# Patient Record
Sex: FEMALE | Race: WHITE | NOT HISPANIC OR LATINO | Employment: FULL TIME | URBAN - NONMETROPOLITAN AREA
[De-identification: names, ages, dates, MRNs, and addresses within clinical notes are randomized per-mention and may not be internally consistent; named-entity substitution may affect disease eponyms.]

---

## 2024-09-25 ENCOUNTER — APPOINTMENT (OUTPATIENT)
Dept: LAB | Facility: HOSPITAL | Age: 59
End: 2024-09-25
Payer: COMMERCIAL

## 2024-09-25 DIAGNOSIS — Z01.818 PRE-OP TESTING: ICD-10-CM

## 2024-09-25 DIAGNOSIS — N95.0 POSTMENOPAUSAL BLEEDING: ICD-10-CM

## 2024-09-25 LAB
BASOPHILS # BLD AUTO: 0.04 THOUSANDS/ΜL (ref 0–0.1)
BASOPHILS NFR BLD AUTO: 1 % (ref 0–1)
EOSINOPHIL # BLD AUTO: 0.11 THOUSAND/ΜL (ref 0–0.61)
EOSINOPHIL NFR BLD AUTO: 2 % (ref 0–6)
ERYTHROCYTE [DISTWIDTH] IN BLOOD BY AUTOMATED COUNT: 12 % (ref 11.6–15.1)
HCT VFR BLD AUTO: 40.9 % (ref 34.8–46.1)
HGB BLD-MCNC: 13.3 G/DL (ref 11.5–15.4)
IMM GRANULOCYTES # BLD AUTO: 0.02 THOUSAND/UL (ref 0–0.2)
IMM GRANULOCYTES NFR BLD AUTO: 0 % (ref 0–2)
LYMPHOCYTES # BLD AUTO: 2.59 THOUSANDS/ΜL (ref 0.6–4.47)
LYMPHOCYTES NFR BLD AUTO: 35 % (ref 14–44)
MCH RBC QN AUTO: 28.7 PG (ref 26.8–34.3)
MCHC RBC AUTO-ENTMCNC: 32.5 G/DL (ref 31.4–37.4)
MCV RBC AUTO: 88 FL (ref 82–98)
MONOCYTES # BLD AUTO: 0.37 THOUSAND/ΜL (ref 0.17–1.22)
MONOCYTES NFR BLD AUTO: 5 % (ref 4–12)
NEUTROPHILS # BLD AUTO: 4.19 THOUSANDS/ΜL (ref 1.85–7.62)
NEUTS SEG NFR BLD AUTO: 57 % (ref 43–75)
NRBC BLD AUTO-RTO: 0 /100 WBCS
PLATELET # BLD AUTO: 351 THOUSANDS/UL (ref 149–390)
PMV BLD AUTO: 10.2 FL (ref 8.9–12.7)
RBC # BLD AUTO: 4.64 MILLION/UL (ref 3.81–5.12)
WBC # BLD AUTO: 7.32 THOUSAND/UL (ref 4.31–10.16)

## 2024-09-25 PROCEDURE — 36415 COLL VENOUS BLD VENIPUNCTURE: CPT

## 2024-09-25 PROCEDURE — 85025 COMPLETE CBC W/AUTO DIFF WBC: CPT

## 2024-09-30 ENCOUNTER — ANESTHESIA EVENT (OUTPATIENT)
Dept: PERIOP | Facility: HOSPITAL | Age: 59
End: 2024-09-30
Payer: COMMERCIAL

## 2024-10-04 NOTE — H&P
HISTORY AND PHYSICAL    Subjective   Feli Valencia is a 59 year old female.  Chief Complaint   Patient presents with   Consultation     HPI:    This is a 59-year-old  014 presents the today for consultation. Patient was recently seen by our nurse practitioner. Patient states for the past 2 weeks she has had bleeding. Started office light, became heavy. And is currently still having light spotting today. Patient did have a large blood clot removed in the office. Pathology demonstrated endocervical polyp. Patient did have an EMB attempted in the office but no endometrial tissue noted. Here for follow-up    PMH:    Past Medical History:   Diagnosis Date   Thyroid nodule 2014     Past Surgical History:   Procedure Laterality Date   BIOPSY OF UTERUS LINING 8/15   Endometrial biopsy    DELIVERY     Delivery Only   INFORMATION   Other   INFORMATION 2014   biopsy of thyroid nodule   LIGATE/CUT OVIDUCT(S)    REMOVAL OF THYROID LOBE, TOTAL Left 2017   TOTAL THYROID LOBECTOMY UNILATERAL performed by Chitra eHrnandez MD at [place] [facility] [place]   TREAT ECTOPIC PREGNANCY/LAPAROSCOPY    Ectop Preg By Laparoscope     Family History   Problem Relation Name Age of Onset   Heart Disorder Father   fatal MI   Diabetes Mother   Heart Disorder Mother   MI   Neurological Disorder Mother   Dementia     Current Outpatient Medications   Medication Sig Dispense Refill   Cholecalciferol (VITAMIN D) 2000 units Capsule Take 2,000 Units by mouth daily.   Levothyroxine Sodium 25 MCG Oral Tablet (Levoxyl) TAKE 1 TABLET BY MOUTH DAILY (AT LEAST 30 MINUTES PRIOR TO BREAKFAST OR OTHER MEDS) 90 Tablet 0   miSOPROStol 200 MCG Oral Tablet (Cytotec) Take 2 Tablets by mouth once for 1 dose. with food. The night before your procedure 2 Tablet 0   medroxyPROGESTERone Acetate 10 MG Oral Tablet (Provera) Take 1 Tablet by mouth in the morning. 30 Tablet 3     No current facility-administered medications for this visit.  "    Review of patient's allergies indicates:   Allergen Reactions   Chlorhexidine Hives     ROS:  Review of Systems   Constitutional: Negative for activity change, appetite change, fatigue and fever.   HENT: Negative for congestion, facial swelling, hearing loss, sinus pressure and sinus pain.   Eyes: Negative for discharge and itching.   Respiratory: Negative for apnea and chest tightness.   Cardiovascular: Negative for chest pain and leg swelling.   Gastrointestinal: Negative for abdominal distention, abdominal pain and nausea.   Endocrine: Negative for cold intolerance and heat intolerance.   Genitourinary: Negative for difficulty urinating, dyspareunia, vaginal bleeding, vaginal discharge and vaginal pain.   Musculoskeletal: Negative for arthralgias, back pain and gait problem.   Skin: Negative for color change and pallor.   Allergic/Immunologic: Negative for environmental allergies.   Neurological: Negative for dizziness, facial asymmetry and numbness.   Psychiatric/Behavioral: Negative for agitation, behavioral problems and suicidal ideas.         Objective   /70  Pulse 70  Temp 36.5 °C (97.7 °F) (Infrared )  Resp 16  Ht 1.626 m (5' 4\")  Wt 61 kg (134 lb 6.4 oz)  LMP 06/01/2013 (Approximate)  SpO2 98%  BMI 23.07 kg/m²  BSA 1.66 m²     Physical Exam:  Physical Exam  Constitutional:   General: She is not in acute distress.  Appearance: She is not ill-appearing or toxic-appearing.     HENT:   Head: Normocephalic and atraumatic.   Nose: Nose normal.   Mouth/Throat:   Mouth: Mucous membranes are moist.     Eyes:   Conjunctiva/sclera: Conjunctivae normal.   Pupils: Pupils are equal, round, and reactive to light.     Cardiovascular:   Rate and Rhythm: Normal rate and regular rhythm.     Pulmonary:   Effort: No respiratory distress.   Breath sounds: Normal breath sounds.     Abdominal:   Palpations: Abdomen is soft.   Tenderness: There is no abdominal tenderness. There is no guarding. "     Musculoskeletal:   General: No swelling or tenderness.     Neurological:   Mental Status: She is alert and oriented to person, place, and time.     Psychiatric:   Mood and Affect: Mood normal.   Behavior: Behavior normal.     Extremities: no swelling or pain         ASSESSMENT/PLAN:  Feli was seen today for consultation.    Diagnoses and all orders for this visit:    PMB (postmenopausal bleeding)  - CBC WITH WBC DIFFERENTIAL    Pre-op testing  - CBC WITH WBC DIFFERENTIAL    Other orders  - miSOPROStol 200 MCG Oral Tablet (Cytotec); Take 2 Tablets by mouth once for 1 dose. with food. The night before your procedure  - medroxyPROGESTERone Acetate 10 MG Oral Tablet (Provera); Take 1 Tablet by mouth in the morning.      1. Postmenopausal bleeding  Ultrasound reviewed. Seven cm size anteverted uterus.  On exam mild bleeding noted. Os opened  Recommend patient gets sampling with a hysteroscopy, D&C, possible polypectomy. Risks, benefits alternatives were described. All questions were answered. Consents were signed. Patient will be for hysteroscopy D&C at [facility] on October 11    Will give Provera to stop acute bleeding

## 2024-10-09 RX ORDER — MEDROXYPROGESTERONE ACETATE 10 MG
10 TABLET ORAL DAILY
COMMUNITY
Start: 2024-08-22 | End: 2024-10-11

## 2024-10-09 RX ORDER — MISOPROSTOL 200 UG/1
400 TABLET ORAL ONCE
COMMUNITY
Start: 2024-08-22 | End: 2024-10-11

## 2024-10-09 RX ORDER — MULTIVIT-MIN/IRON/FOLIC ACID/K 18-600-40
2000 CAPSULE ORAL DAILY
COMMUNITY

## 2024-10-09 RX ORDER — LEVOTHYROXINE SODIUM 25 UG/1
25 TABLET ORAL DAILY
COMMUNITY

## 2024-10-09 NOTE — PRE-PROCEDURE INSTRUCTIONS
Pre-Surgery Instructions:   Medication Instructions    Cholecalciferol (Vitamin D) 50 MCG (2000 UT) CAPS Stop taking 7 days prior to surgery.    levothyroxine 25 mcg tablet Take day of surgery.    medroxyPROGESTERone (PROVERA) 10 mg tablet Instructions provided by MD   Misoprostol (Cytotec) 200mcg pt to take 2 tablets the night prior to surgery       Pt verbalizes understanding of the following:    Please reference your “My Surgical Experience Booklet” for additional information to prepare for your upcoming surgery.      - DO NOT EAT OR DRINK ANYTHING after midnight on the evening before your procedure including coffee, tea, gum or hard candy.    - ONLY SIPS OF WATER with your medications are allowed on the morning of your procedure.  - Avoid OTC non-directed Vit/ Suppl/ Herbals 7 days prior to surgery to ensure no drug interactions with perioperative surgical/ anesthetic meds  - Avoid NSAIDs 3 days prior. Tylenol is ok to take as needed.   - Avoid ASA containing products 5 days prior, unless otherwise instructed by your provider     - Avoid alcohol 24hrs before your surgery.     - Follow the pre surgery showering instructions as listed in the “My Surgical Experience Booklet” or otherwise provided by your surgeon's office.  - Bathing instructions, will use dial or safeguard  - No lotions, powders, sprays, deodorant, perfume, jewelry, body piercings, false lashes or make-up  - Do not use a blade to shave the surgical area 1 week before surgery. It is ok to use clean electric clippers up to 24 hours before surgery. Do not shave any body parts with a razor within 24hrs.  - Do not use dry shampoo, hair spray, hair gel, or any type of hair products.   - Remove nail polish, including gel polish, and any artificial, gel, or acrylic nails if possible.    - For outpatient surgery, arrange for someone to drive you home after the procedure & stay with you until the next morning. Visitor guidelines discussed.   - Bring  "insurance cards & photo id    - Please Bring a case for your glasses   - Leave all valuables such as credit cards, money & jewelry at home  - Wear causal clothing that is easy to take on and off. Consider your type of surgery.    - Notify surgeon if you develop any new illnesses, exposure, develop a rash/ open wounds or have additional questions prior to your surgery.    - Did the surgeon's office give you any other special instructions? Dr. Perez \"gave me a pill to take the night before sx\"  - Did surgeon require any clearances? no    You will receive a call one business day prior to surgery with an arrival time and hospital directions. If your surgery is scheduled on a Monday, the hospital will be calling you on the Friday prior to your surgery.     Please confirm the visitor policy for the day of your procedure when you receive your phone call with an arrival time.                    "

## 2024-10-10 NOTE — ANESTHESIA PREPROCEDURE EVALUATION
Procedure:  HYSTEROSCOPY, DILATION AND CURETTAGE, POSSIBLE POLYPECTOMY (Uterus)    Relevant Problems   No relevant active problems        Physical Exam    Airway    Mallampati score: II  TM Distance: >3 FB  Neck ROM: full     Dental   No notable dental hx     Cardiovascular  Cardiovascular exam normal    Pulmonary  Pulmonary exam normal     Other Findings  post-pubertal.      Anesthesia Plan  ASA Score- 2     Anesthesia Type- general with ASA Monitors.         Additional Monitors:     Airway Plan: LMA.           Plan Factors-Exercise tolerance (METS): >4 METS.    Chart reviewed.  Imaging results reviewed. Existing labs reviewed. Patient summary reviewed.    Patient is not a current smoker.      Obstructive sleep apnea risk education given perioperatively.        Induction- intravenous.    Postoperative Plan-     Perioperative Resuscitation Plan - Level 1 - Full Code.       Informed Consent- Anesthetic plan and risks discussed with patient.  I personally reviewed this patient with the CRNA. Discussed and agreed on the Anesthesia Plan with the CRNA..

## 2024-10-11 ENCOUNTER — HOSPITAL ENCOUNTER (OUTPATIENT)
Facility: HOSPITAL | Age: 59
Setting detail: OUTPATIENT SURGERY
Discharge: HOME | End: 2024-10-11
Attending: OBSTETRICS & GYNECOLOGY | Admitting: OBSTETRICS & GYNECOLOGY
Payer: COMMERCIAL

## 2024-10-11 ENCOUNTER — ANESTHESIA (OUTPATIENT)
Dept: PERIOP | Facility: HOSPITAL | Age: 59
End: 2024-10-11
Payer: COMMERCIAL

## 2024-10-11 VITALS
OXYGEN SATURATION: 98 % | WEIGHT: 135 LBS | SYSTOLIC BLOOD PRESSURE: 137 MMHG | TEMPERATURE: 97.5 F | BODY MASS INDEX: 23.92 KG/M2 | HEIGHT: 63 IN | HEART RATE: 58 BPM | RESPIRATION RATE: 20 BRPM | DIASTOLIC BLOOD PRESSURE: 69 MMHG

## 2024-10-11 DIAGNOSIS — N95.0 POSTMENOPAUSAL BLEEDING: ICD-10-CM

## 2024-10-11 PROCEDURE — 88305 TISSUE EXAM BY PATHOLOGIST: CPT | Performed by: STUDENT IN AN ORGANIZED HEALTH CARE EDUCATION/TRAINING PROGRAM

## 2024-10-11 RX ORDER — PROPOFOL 10 MG/ML
INJECTION, EMULSION INTRAVENOUS CONTINUOUS PRN
Status: DISCONTINUED | OUTPATIENT
Start: 2024-10-11 | End: 2024-10-11

## 2024-10-11 RX ORDER — MIDAZOLAM HYDROCHLORIDE 2 MG/2ML
INJECTION, SOLUTION INTRAMUSCULAR; INTRAVENOUS AS NEEDED
Status: DISCONTINUED | OUTPATIENT
Start: 2024-10-11 | End: 2024-10-11

## 2024-10-11 RX ORDER — SODIUM CHLORIDE, SODIUM LACTATE, POTASSIUM CHLORIDE, CALCIUM CHLORIDE 600; 310; 30; 20 MG/100ML; MG/100ML; MG/100ML; MG/100ML
INJECTION, SOLUTION INTRAVENOUS CONTINUOUS PRN
Status: DISCONTINUED | OUTPATIENT
Start: 2024-10-11 | End: 2024-10-11

## 2024-10-11 RX ORDER — FENTANYL CITRATE/PF 50 MCG/ML
50 SYRINGE (ML) INJECTION
Status: DISCONTINUED | OUTPATIENT
Start: 2024-10-11 | End: 2024-10-11 | Stop reason: HOSPADM

## 2024-10-11 RX ORDER — DEXAMETHASONE SODIUM PHOSPHATE 10 MG/ML
INJECTION, SOLUTION INTRAMUSCULAR; INTRAVENOUS AS NEEDED
Status: DISCONTINUED | OUTPATIENT
Start: 2024-10-11 | End: 2024-10-11

## 2024-10-11 RX ORDER — FENTANYL CITRATE 50 UG/ML
INJECTION, SOLUTION INTRAMUSCULAR; INTRAVENOUS AS NEEDED
Status: DISCONTINUED | OUTPATIENT
Start: 2024-10-11 | End: 2024-10-11

## 2024-10-11 RX ORDER — LIDOCAINE HYDROCHLORIDE 10 MG/ML
INJECTION, SOLUTION EPIDURAL; INFILTRATION; INTRACAUDAL; PERINEURAL AS NEEDED
Status: DISCONTINUED | OUTPATIENT
Start: 2024-10-11 | End: 2024-10-11

## 2024-10-11 RX ORDER — MAGNESIUM HYDROXIDE 1200 MG/15ML
LIQUID ORAL AS NEEDED
Status: DISCONTINUED | OUTPATIENT
Start: 2024-10-11 | End: 2024-10-11 | Stop reason: HOSPADM

## 2024-10-11 RX ORDER — ONDANSETRON 2 MG/ML
INJECTION INTRAMUSCULAR; INTRAVENOUS AS NEEDED
Status: DISCONTINUED | OUTPATIENT
Start: 2024-10-11 | End: 2024-10-11

## 2024-10-11 RX ORDER — CEFAZOLIN SODIUM 2 G/50ML
2000 SOLUTION INTRAVENOUS ONCE
Status: COMPLETED | OUTPATIENT
Start: 2024-10-11 | End: 2024-10-11

## 2024-10-11 RX ORDER — KETOROLAC TROMETHAMINE 30 MG/ML
INJECTION, SOLUTION INTRAMUSCULAR; INTRAVENOUS AS NEEDED
Status: DISCONTINUED | OUTPATIENT
Start: 2024-10-11 | End: 2024-10-11

## 2024-10-11 RX ORDER — PROPOFOL 10 MG/ML
INJECTION, EMULSION INTRAVENOUS AS NEEDED
Status: DISCONTINUED | OUTPATIENT
Start: 2024-10-11 | End: 2024-10-11

## 2024-10-11 RX ORDER — ONDANSETRON 2 MG/ML
4 INJECTION INTRAMUSCULAR; INTRAVENOUS EVERY 6 HOURS PRN
Status: DISCONTINUED | OUTPATIENT
Start: 2024-10-11 | End: 2024-10-11 | Stop reason: HOSPADM

## 2024-10-11 RX ADMIN — SODIUM CHLORIDE, SODIUM LACTATE, POTASSIUM CHLORIDE, AND CALCIUM CHLORIDE: .6; .31; .03; .02 INJECTION, SOLUTION INTRAVENOUS at 07:27

## 2024-10-11 RX ADMIN — PROPOFOL 200 MG: 10 INJECTION, EMULSION INTRAVENOUS at 07:35

## 2024-10-11 RX ADMIN — MIDAZOLAM 2 MG: 1 INJECTION INTRAMUSCULAR; INTRAVENOUS at 07:30

## 2024-10-11 RX ADMIN — PROPOFOL 100 MCG/KG/MIN: 10 INJECTION, EMULSION INTRAVENOUS at 07:38

## 2024-10-11 RX ADMIN — LIDOCAINE HYDROCHLORIDE 50 MG: 10 INJECTION, SOLUTION EPIDURAL; INFILTRATION; INTRACAUDAL; PERINEURAL at 07:35

## 2024-10-11 RX ADMIN — ONDANSETRON 4 MG: 2 INJECTION INTRAMUSCULAR; INTRAVENOUS at 07:35

## 2024-10-11 RX ADMIN — DEXAMETHASONE SODIUM PHOSPHATE 10 MG: 10 INJECTION, SOLUTION INTRAMUSCULAR; INTRAVENOUS at 07:35

## 2024-10-11 RX ADMIN — FENTANYL CITRATE 50 MCG: 50 INJECTION INTRAMUSCULAR; INTRAVENOUS at 07:46

## 2024-10-11 RX ADMIN — KETOROLAC TROMETHAMINE 30 MG: 30 INJECTION, SOLUTION INTRAMUSCULAR at 07:55

## 2024-10-11 RX ADMIN — CEFAZOLIN SODIUM 2000 MG: 2 SOLUTION INTRAVENOUS at 07:36

## 2024-10-11 NOTE — ANESTHESIA POSTPROCEDURE EVALUATION
Post-Op Assessment Note    Last Filed PACU Vitals:  Vitals Value Taken Time   Temp 97.3 °F (36.3 °C) 10/11/24 0841   Pulse 63 10/11/24 0841   /64 10/11/24 0841   Resp 16 10/11/24 0841   SpO2 98 % 10/11/24 0841       Modified Kandice:  Activity: 2 (10/11/2024  9:12 AM)  Respiration: 2 (10/11/2024  9:12 AM)  Circulation: 2 (10/11/2024  9:12 AM)  Consciousness: 2 (10/11/2024  9:12 AM)  Oxygen Saturation: 2 (10/11/2024  9:12 AM)  Modified Kandice Score: 10 (10/11/2024  9:12 AM)

## 2024-10-11 NOTE — DISCHARGE SUMMARY
Discharge Summary - OB/GYN   Name: Feli Valencia 59 y.o. female I MRN: [id number]  Unit/Bed#: OR POOL I Date of Admission: 10/11/2024   Date of Service: 10/11/2024 I Hospital Day: 0    Admission Date: 10/11/2024 0557  Discharge Date: 10/11/24  Admitting Diagnosis: Postmenopausal bleeding [N95.0]  Discharge Diagnosis:   Medical Problems          Procedures Performed: Hysteroscopy D and C      Summary of Hospital Course:  Patient was here for scheduled surgery. No issues and discharged home the same day     Significant Findings, Care, Treatment and Services Provided: atrophic endometrium    Complications: none    Condition at Discharge: good       Discharge instructions/Information to patient and family:   See After Visit Summary (AVS) for information provided to patient and family.      Provisions for Follow-Up Care:  See after visit summary for information related to follow-up care and any pertinent home health orders.      PCP: Colin Guerrero DO    Disposition: Home    Planned Readmission: No     Discharge Medications:  See after visit summary for reconciled discharge medications provided to patient and family.      Discharge Statement:  I have spent a total time of 15 minutes in caring for this patient on the day of the visit/encounter.

## 2024-10-11 NOTE — ANESTHESIA POSTPROCEDURE EVALUATION
Post-Op Assessment Note    CV Status:  Stable  Pain Score: 0    Pain management: adequate       Mental Status:  Sleepy and arousable   Hydration Status:  Euvolemic   PONV Controlled:  Controlled   Airway Patency:  Patent     Post Op Vitals Reviewed: Yes    No anethesia notable event occurred.    Staff: CRNA           Last Filed PACU Vitals:  Vitals Value Taken Time   Temp 97.2    Pulse 64 10/11/24 0811   /60    Resp 12 10/11/24 0811   SpO2 99 % 10/11/24 0811   Vitals shown include unfiled device data.    Modified Kandice:  No data recorded

## 2024-10-11 NOTE — DISCHARGE INSTR - AVS FIRST PAGE
Please take Tylenol and Advil as needed  No lifting restrictions  No sex, tampons or douching until you see me in the office  Please make an appointment to see me in the office in 3 weeks  Please call with any abnormal discharge like pus.  Fevers, etc.  Regular diet.  May shower tonight or tomorrow

## 2024-10-11 NOTE — OP NOTE
OPERATIVE REPORT  PATIENT NAME: Feli Valencia    :  1965  MRN: [id number]  Pt Location: OW OR ROOM 02    SURGERY DATE: 10/11/2024    Surgeons and Role:     * Marcelo Perez Jr., DO - Primary    Preop Diagnosis:  Postmenopausal bleeding [N95.0]    Post-Op Diagnosis Codes:     * Postmenopausal bleeding [N95.0]    Procedure(s):  HYSTEROSCOPY. DILATION AND CURETTAGE. POSSIBLE POLYPECTOMY    Specimen(s):  ID Type Source Tests Collected by Time Destination   1 : Endometrial currettings Tissue Endometrium TISSUE EXAM Marcelo Perez JrShahid, DO 10/11/2024  7:53 AM        Estimated Blood Loss:   Minimal    Drains:  * No LDAs found *    Anesthesia Type:   General    Operative Indications:  Postmenopausal bleeding [N95.0]      Operative Findings:  Atrophic endometrium      Complications:   None    Procedure and Technique:  The patient was taken to the operating room where general anesthesia was administered without difficulty.  The patient was placed in the dorsolithotomy position with stirrups.  An exam under anesthesia revealed a normal anteverted uterus.  The patient was then prepared and draped in the normal sterile fashion.    A weighted speculum was inserted into the posterior aspect of the vagina.  A single-tooth tenaculum was used to grasp the entry of the cervix.  The uterus was carefully sounded to accommodate the hysteroscope using dilators.  The hysteroscope was then introduced under direct visualization, the uterus was distended with normal saline.  Patient had atrophic endometrium. No masses or polyps noted. The hysteroscope was then withdrawn and the cervix was again dilated to accommodate the curette.  The uterus was curetted in a clockwise fashion. The scrapings were then sent to pathology.  The tenaculum was removed from the cervix and good hemostasis was noted.    The patient tolerated procedure well.  The instrument and sponge counts were correct x2.  The patient was awakened from general  anesthesia and taken to the recovery room in a stable condition.    The patient will go home after recovery from anesthesia and meeting all the criteria for discharge.  She is given instruction regarding a follow visit in two weeks in the office.     Patient Disposition:  PACU              SIGNATURE: Marcelo Perez Jr.,   DATE: October 11, 2024  TIME: 7:58 AM

## 2024-10-11 NOTE — INTERVAL H&P NOTE
H&P reviewed. After examining the patient I find no changes in the patients condition since the H&P had been written.    Vitals:    10/11/24 0639   BP: 145/75   Pulse: 63   Resp: 18   Temp: (!) 97.2 °F (36.2 °C)   SpO2: 99%

## 2024-10-17 PROCEDURE — 88305 TISSUE EXAM BY PATHOLOGIST: CPT | Performed by: STUDENT IN AN ORGANIZED HEALTH CARE EDUCATION/TRAINING PROGRAM

## (undated) DEVICE — TISSUE REMOVAL SYSTEM FLUID MANAGEMENT ACCESSORIES: Brand: SYMPHION

## (undated) DEVICE — WET SKIN PREP TRAY: Brand: MEDLINE INDUSTRIES, INC.

## (undated) DEVICE — THREE-QUARTER SHEET: Brand: CONVERTORS

## (undated) DEVICE — PVC URETHRAL CATHETER: Brand: DOVER

## (undated) DEVICE — STRL ALLENTOWN HYSTEROSCOPY PK: Brand: CARDINAL HEALTH

## (undated) DEVICE — DRAPE,UNDERBUTTOCKS,PCH,STERILE: Brand: MEDLINE

## (undated) DEVICE — 4-PORT MANIFOLD: Brand: NEPTUNE 2

## (undated) DEVICE — DRAPE EQUIPMENT RF WAND

## (undated) DEVICE — GLOVE INDICATOR PI UNDERGLOVE SZ 7.5 BLUE

## (undated) DEVICE — PREMIUM DRY TRAY LF: Brand: MEDLINE INDUSTRIES, INC.

## (undated) DEVICE — MAYO STAND COVER: Brand: CONVERTORS

## (undated) DEVICE — MAT ABSORBANT ARTHROSCOPY FLOOR 46 X 40 IN

## (undated) DEVICE — LUBRICANT JELLY SURGILUBE TUBE 4OZ FLIP TOP

## (undated) DEVICE — PAD SANITARY